# Patient Record
Sex: MALE | Race: BLACK OR AFRICAN AMERICAN | NOT HISPANIC OR LATINO | Employment: FULL TIME | URBAN - METROPOLITAN AREA
[De-identification: names, ages, dates, MRNs, and addresses within clinical notes are randomized per-mention and may not be internally consistent; named-entity substitution may affect disease eponyms.]

---

## 2022-02-20 ENCOUNTER — HOSPITAL ENCOUNTER (EMERGENCY)
Facility: CLINIC | Age: 27
Discharge: HOME | End: 2022-02-20
Attending: EMERGENCY MEDICINE | Admitting: EMERGENCY MEDICINE
Payer: COMMERCIAL

## 2022-02-20 ENCOUNTER — APPOINTMENT (OUTPATIENT)
Dept: GENERAL RADIOLOGY | Facility: CLINIC | Age: 27
End: 2022-02-20
Attending: EMERGENCY MEDICINE
Payer: COMMERCIAL

## 2022-02-20 VITALS
OXYGEN SATURATION: 100 % | DIASTOLIC BLOOD PRESSURE: 60 MMHG | HEART RATE: 85 BPM | TEMPERATURE: 98.4 F | WEIGHT: 150 LBS | RESPIRATION RATE: 18 BRPM | SYSTOLIC BLOOD PRESSURE: 122 MMHG
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
## 2022-02-20 DIAGNOSIS — S50.02XA CONTUSION OF LEFT ELBOW, INITIAL ENCOUNTER: ICD-10-CM

## 2022-02-20 PROCEDURE — 99284 EMERGENCY DEPT VISIT MOD MDM: CPT | Performed by: EMERGENCY MEDICINE

## 2022-02-20 PROCEDURE — 73080 X-RAY EXAM OF ELBOW: CPT | Mod: LT

## 2022-02-20 PROCEDURE — 73080 X-RAY EXAM OF ELBOW: CPT | Mod: 26 | Performed by: RADIOLOGY

## 2022-02-20 PROCEDURE — 250N000011 HC RX IP 250 OP 636: Performed by: EMERGENCY MEDICINE

## 2022-02-20 PROCEDURE — 96372 THER/PROPH/DIAG INJ SC/IM: CPT | Performed by: EMERGENCY MEDICINE

## 2022-02-20 PROCEDURE — 250N000013 HC RX MED GY IP 250 OP 250 PS 637: Performed by: EMERGENCY MEDICINE

## 2022-02-20 RX ORDER — HYDROMORPHONE HYDROCHLORIDE 1 MG/ML
0.5 INJECTION, SOLUTION INTRAMUSCULAR; INTRAVENOUS; SUBCUTANEOUS
Status: DISCONTINUED | OUTPATIENT
Start: 2022-02-20 | End: 2022-02-20 | Stop reason: HOSPADM

## 2022-02-20 RX ORDER — HYDROMORPHONE HYDROCHLORIDE 1 MG/ML
0.5 INJECTION, SOLUTION INTRAMUSCULAR; INTRAVENOUS; SUBCUTANEOUS ONCE
Status: COMPLETED | OUTPATIENT
Start: 2022-02-20 | End: 2022-02-20

## 2022-02-20 RX ORDER — IBUPROFEN 600 MG/1
600 TABLET, FILM COATED ORAL ONCE
Status: COMPLETED | OUTPATIENT
Start: 2022-02-20 | End: 2022-02-20

## 2022-02-20 RX ORDER — IBUPROFEN 600 MG/1
600 TABLET, FILM COATED ORAL EVERY 6 HOURS PRN
Qty: 30 TABLET | Refills: 0 | Status: SHIPPED | OUTPATIENT
Start: 2022-02-20 | End: 2024-02-26

## 2022-02-20 RX ADMIN — HYDROMORPHONE HYDROCHLORIDE 0.5 MG: 1 INJECTION, SOLUTION INTRAMUSCULAR; INTRAVENOUS; SUBCUTANEOUS at 02:30

## 2022-02-20 RX ADMIN — IBUPROFEN 600 MG: 600 TABLET ORAL at 02:55

## 2022-02-20 ASSESSMENT — ENCOUNTER SYMPTOMS
DIZZINESS: 0
DIARRHEA: 0
EYE PAIN: 0
DIFFICULTY URINATING: 0
NAUSEA: 0
CHILLS: 0
ABDOMINAL PAIN: 0
COUGH: 0
JOINT SWELLING: 1
CONSTIPATION: 0
COLOR CHANGE: 0
WEAKNESS: 0
ABDOMINAL DISTENTION: 0
FATIGUE: 0
VOMITING: 0
CHEST TIGHTNESS: 0
SHORTNESS OF BREATH: 0
FEVER: 0
SORE THROAT: 0
FREQUENCY: 0
BACK PAIN: 0
MYALGIAS: 0
NECK PAIN: 0
DYSURIA: 0
CONFUSION: 0
HEADACHES: 0
ARTHRALGIAS: 0
PALPITATIONS: 0

## 2022-02-20 NOTE — ED TRIAGE NOTES
Pt fell on the ice outside 3 hours ago, causing injury to the left arm, pt unable to move arm, CMS intact, pt complains of pain in the elbow.

## 2022-02-20 NOTE — ED TRIAGE NOTES
Pt fell on the ice 3 hours ago, states he injured his right arm, pt complains of pain more in the elbow. CMS Intact

## 2023-04-20 ENCOUNTER — HOSPITAL ENCOUNTER (EMERGENCY)
Facility: CLINIC | Age: 28
Discharge: HOME | End: 2023-04-20
Attending: EMERGENCY MEDICINE | Admitting: EMERGENCY MEDICINE
Payer: COMMERCIAL

## 2023-04-20 VITALS
HEIGHT: 65 IN | HEART RATE: 66 BPM | BODY MASS INDEX: 23.32 KG/M2 | OXYGEN SATURATION: 100 % | DIASTOLIC BLOOD PRESSURE: 85 MMHG | SYSTOLIC BLOOD PRESSURE: 126 MMHG | RESPIRATION RATE: 18 BRPM | WEIGHT: 140 LBS | TEMPERATURE: 97.6 F

## 2023-04-20 DIAGNOSIS — Z91.09 ENVIRONMENTAL ALLERGIES: ICD-10-CM

## 2023-04-20 PROCEDURE — 250N000013 HC RX MED GY IP 250 OP 250 PS 637: Performed by: EMERGENCY MEDICINE

## 2023-04-20 PROCEDURE — 99284 EMERGENCY DEPT VISIT MOD MDM: CPT | Mod: 25

## 2023-04-20 PROCEDURE — 250N000009 HC RX 250: Performed by: EMERGENCY MEDICINE

## 2023-04-20 RX ORDER — CETIRIZINE HYDROCHLORIDE 10 MG/1
10 TABLET ORAL DAILY
Qty: 30 TABLET | Refills: 0 | Status: SHIPPED | OUTPATIENT
Start: 2023-04-20 | End: 2024-02-26

## 2023-04-20 RX ORDER — DIPHENHYDRAMINE HCL 50 MG
50 CAPSULE ORAL ONCE
Status: COMPLETED | OUTPATIENT
Start: 2023-04-20 | End: 2023-04-20

## 2023-04-20 RX ORDER — POLYVINYL ALCOHOL 14 MG/ML
1 SOLUTION/ DROPS OPHTHALMIC
Qty: 15 ML | Refills: 0 | Status: SHIPPED | OUTPATIENT
Start: 2023-04-20 | End: 2024-02-26

## 2023-04-20 RX ORDER — PROPARACAINE HYDROCHLORIDE 5 MG/ML
1 SOLUTION/ DROPS OPHTHALMIC ONCE
Status: COMPLETED | OUTPATIENT
Start: 2023-04-20 | End: 2023-04-20

## 2023-04-20 RX ADMIN — PROPARACAINE HYDROCHLORIDE 1 DROP: 5 SOLUTION/ DROPS OPHTHALMIC at 06:30

## 2023-04-20 RX ADMIN — DIPHENHYDRAMINE HYDROCHLORIDE 50 MG: 50 CAPSULE ORAL at 06:30

## 2023-04-20 ASSESSMENT — ACTIVITIES OF DAILY LIVING (ADL): ADLS_ACUITY_SCORE: 35

## 2023-04-20 ASSESSMENT — VISUAL ACUITY: OU: 1

## 2023-04-20 ASSESSMENT — TONOMETRY
OD_IOP_MMHG: 10
OS_IOP_MMHG: 12

## 2023-04-20 NOTE — DISCHARGE INSTRUCTIONS
I suspect your dry eyes symptoms are related to environmental allergies.  I have started you on an oral antihistamine.  I have also started you on lubricating eyedrops.  Please take these medications as prescribed andmake an appointment to follow up with Eye Clinic (phone: [phone]) as soon as possible unless symptoms completely resolve.  You should also follow-up with your primary care physician as originally scheduled.

## 2023-04-20 NOTE — ED PROVIDER NOTES
"ED Provider Note  [facility]      History     Chief Complaint   Patient presents with     Allergic Reaction     HPI  Oliva Martinez is a 28 year old male who presents to the ED with concern for environmental allergies.  He states he has had severe the watery/itchy eyes for the past 3 weeks.  Has been progressively worsening.  Denies any obvious toxic exposures.  No nasal congestion, rhinorrhea, scratchy throat, or other allergy symptoms.  He has had similar symptoms in the past.  He last had a severe episode in 2021.  He went to the [facility] clinic and was prescribed \"eyedrops that made it feel better\".  He denies foreign bodies.  No vision change.  Has been progressively worsening for the past 3 weeks.  He did try some antihistamine eyedrops that he appears at the pharmacy and that seemed to make the symptoms worse.    Past Medical History  No past medical history on file.  No past surgical history on file.  cetirizine (ZYRTEC) 10 MG tablet  polyvinyl alcohol (LIQUIFILM TEARS) 1.4 % ophthalmic solution  ibuprofen (ADVIL/MOTRIN) 600 MG tablet      No Known Allergies  Family History  No family history on file.  Social History          A medically appropriate review of systems was performed with pertinent positives and negatives noted in the HPI, and all other systems negative.    Physical Exam   BP: 126/85  Pulse: 66  Temp: 97.6  F (36.4  C)  Resp: 18  Height: 165.1 cm (5' 5\")  Weight: 63.5 kg (140 lb)  SpO2: 100 %  Physical Exam  Vitals and nursing note reviewed.   Constitutional:       General: He is not in acute distress.     Appearance: Normal appearance.   HENT:      Head: Normocephalic.      Nose: Nose normal.   Eyes:      General: Lids are normal. Lids are everted, no foreign bodies appreciated. Vision grossly intact. Gaze aligned appropriately. No visual field deficit or scleral icterus.        Right eye: No discharge.      Intraocular pressure: Right eye pressure is 10 mmHg. " Left eye pressure is 12 mmHg.      Extraocular Movements: Extraocular movements intact.      Conjunctiva/sclera: Conjunctivae normal.      Right eye: Right conjunctiva is not injected. No chemosis, exudate or hemorrhage.     Left eye: Left conjunctiva is not injected. No chemosis, exudate or hemorrhage.     Pupils: Pupils are equal, round, and reactive to light.   Cardiovascular:      Rate and Rhythm: Normal rate and regular rhythm.   Pulmonary:      Effort: Pulmonary effort is normal.   Abdominal:      General: There is no distension.   Musculoskeletal:         General: No deformity. Normal range of motion.      Cervical back: Normal range of motion.   Skin:     General: Skin is warm.   Neurological:      Mental Status: He is alert and oriented to person, place, and time.   Psychiatric:         Mood and Affect: Mood normal.           ED Course, Procedures, & Data      No results found for any visits on 04/20/23.  Medications   diphenhydrAMINE (BENADRYL) capsule 50 mg (50 mg Oral $Given 4/20/23 0630)   proparacaine (ALCAINE) 0.5 % ophthalmic solution 1 drop (1 drop Both Eyes $Given 4/20/23 0630)     Labs Ordered and Resulted from Time of ED Arrival to Time of ED Departure - No data to display  No orders to display          Critical care was not performed.     Medical Decision Making  The patient's presentation was of low complexity (an acute and uncomplicated illness or injury).    The patient's evaluation involved:  history and exam without other MDM data elements    The patient's management necessitated moderate risk (prescription drug management including medications given in the ED).      Assessment & Plan    Patient presents to the ED for evaluation of dry eyes.  Has history environmental allergies.  Has had a similar symptoms in the past.  On arrival, he is complaining of severe discomfort in his eyes.  His eyes were all appear unremarkable.  No significant scleral injection.  No evidence of corneal abrasion.   No evidence of keratitis.  Intraocular pressures are normal bilaterally.  Vision is grossly intact.    Patient was given proparacaine with significant relief.  I suspect he likely has eye dryness/irritation from environmental allergies.  Started on Zyrtec and artificial tears.  Advised PCP follow up.  Also given eye clinic follow-up should his symptoms persist.    I have reviewed the nursing notes. I have reviewed the findings, diagnosis, plan and need for follow up with the patient.    Discharge Medication List as of 4/20/2023  6:56 AM      START taking these medications    Details   cetirizine (ZYRTEC) 10 MG tablet Take 1 tablet (10 mg) by mouth daily, Disp-30 tablet, R-0, Local Print      polyvinyl alcohol (LIQUIFILM TEARS) 1.4 % ophthalmic solution Place 1 drop into both eyes every 2 hours as needed for dry eyes, Disp-15 mL, R-0, Local Print             Final diagnoses:   Environmental allergies       Roderick Arellano DO  [facility] EMERGENCY DEPARTMENT  4/20/2023     Roderick Arellano DO  04/20/23 0719

## 2023-04-20 NOTE — ED TRIAGE NOTES
Pt c/o allergies, watery eyes, runny nose, afebrile, no cough, itchy eyes-both, two weeks ago, attempted to get appt at clinic, has gotten worse over the last few days, taking benadryl and over the counter eye drops, the watery eyes get worse when sleeping, pt with eyes closed in triage, itching eyes.     Triage Assessment     Row Name 04/20/23 0543       Triage Assessment (Adult)    Airway WDL WDL       Respiratory WDL    Respiratory WDL WDL       Skin Circulation/Temperature WDL    Skin Circulation/Temperature WDL WDL       Cardiac WDL    Cardiac WDL WDL       Peripheral/Neurovascular WDL    Peripheral Neurovascular WDL WDL       Cognitive/Neuro/Behavioral WDL    Cognitive/Neuro/Behavioral WDL WDL

## 2023-08-08 ENCOUNTER — HOSPITAL ENCOUNTER (EMERGENCY)
Facility: CLINIC | Age: 28
Discharge: HOME | End: 2023-08-08
Attending: EMERGENCY MEDICINE | Admitting: EMERGENCY MEDICINE
Payer: MEDICAID

## 2023-08-08 VITALS
BODY MASS INDEX: 22.82 KG/M2 | TEMPERATURE: 97.9 F | DIASTOLIC BLOOD PRESSURE: 84 MMHG | RESPIRATION RATE: 14 BRPM | OXYGEN SATURATION: 100 % | HEART RATE: 69 BPM | SYSTOLIC BLOOD PRESSURE: 124 MMHG | WEIGHT: 142 LBS | HEIGHT: 66 IN

## 2023-08-08 DIAGNOSIS — K14.6 TONGUE PAIN: ICD-10-CM

## 2023-08-08 DIAGNOSIS — K12.0 ORAL APHTHOUS ULCER: ICD-10-CM

## 2023-08-08 PROCEDURE — 99283 EMERGENCY DEPT VISIT LOW MDM: CPT | Performed by: EMERGENCY MEDICINE

## 2023-08-08 PROCEDURE — 99282 EMERGENCY DEPT VISIT SF MDM: CPT | Performed by: EMERGENCY MEDICINE

## 2023-08-08 NOTE — ED PROVIDER NOTES
History     Chief Complaint   Patient presents with    Oral Swelling     HPI  Oliva Martinez is a 28 year old otherwise healthy male who presents to the emergency department with a chief complaint of tongue pain.  Started on Sunday night.  No recent trauma.  Located to left side of tongue in the front.  There is a white lesion in this area.  No other areas of pain/other lesions.  The pain is worse with palpation of the affected area.  The patient has not tried any medications or other management of the lesion/pain.    The patient presents to the emergency department today accompanied by his mother.    I have reviewed the Medications, Allergies, Past Medical and Surgical History, and Social History in the Epic system.    History reviewed. No pertinent past medical history.  History reviewed. No pertinent surgical history.  No current facility-administered medications for this encounter.     Current Outpatient Medications   Medication    cetirizine (ZYRTEC) 10 MG tablet    ibuprofen (ADVIL/MOTRIN) 600 MG tablet    polyvinyl alcohol (LIQUIFILM TEARS) 1.4 % ophthalmic solution     No Known Allergies  Past medical history, past surgical history, medications, and allergies were reviewed with the patient. Additional pertinent items: None    Social History     Socioeconomic History    Marital status: Single     Spouse name: Not on file    Number of children: Not on file    Years of education: Not on file    Highest education level: Not on file   Occupational History    Not on file   Tobacco Use    Smoking status: Never    Smokeless tobacco: Never   Substance and Sexual Activity    Alcohol use: Never    Drug use: Never    Sexual activity: Not on file   Other Topics Concern    Not on file   Social History Narrative    Not on file     Social Determinants of Health     Financial Resource Strain: Not on file   Food Insecurity: Not on file   Transportation Needs: Not on file   Physical Activity: Not on file   Stress: Not on  "file   Social Connections: Not on file   Intimate Partner Violence: Not on file   Housing Stability: Not on file     Social history was reviewed with the patient. Additional pertinent items: None    Review of Systems  A medically appropriate review of systems was performed with pertinent positives and negatives noted in the HPI, and all other systems negative.    Physical Exam   BP: 124/84  Pulse: 69  Temp: 97.9  F (36.6  C)  Resp: 14  Height: 167.6 cm (5' 6\")  Weight: 64.4 kg (142 lb)  SpO2: 100 %      General: Well nourished, well developed, NAD  HEENT: EOMI, anicteric. NCAT, MMM, tenderness and white-colored lesion to anterior left tongue on the underside, no other oral lesions noted  Neck: no jugular venous distension, supple, nl ROM  Cardiac: Regular rate, extremities well-perfused  Pulm: Airway patent, NLB, normal RR  Skin: Warm and dry to the touch.  No rash  Extremities: No LE edema, no cyanosis, w/w/p  Neuro: A&Ox3, no gross focal deficits    ED Course        Procedures                           Labs Ordered and Resulted from Time of ED Arrival to Time of ED Departure - No data to display         No results found for this or any previous visit (from the past 24 hour(s)).    Labs, vital signs, and imaging studies were reviewed by me.    Medications - No data to display    Assessments & Plan (with Medical Decision Making)   Oliva Martinez is a 28 year old male who presents with tongue pain.  Examination is consistent with aphthous ulcer    Critical care was not performed.     Medical Decision Making  The patient's presentation was of low complexity (an acute and uncomplicated illness or injury).    The patient's evaluation involved:  history and exam without other MDM data elements    The patient's management necessitated moderate risk (prescription drug management including medications given in the ED).    I have reviewed the nursing notes.    I have reviewed the findings, diagnosis, plan and need for " follow up with the patient.    Patient to be discharged home. Advised to follow up with PCP within 1 week. To return to ER immediately with any new/worsening symptoms. Plan of care discussed with patient who expresses understanding and agrees with plan of care.    New Prescriptions    BENZOCAINE (ANBESOL) 10 % GEL    Take by mouth 4 times daily as needed for mouth sores       Final diagnoses:   Tongue pain   Oral aphthous ulcer       FLORECITA GONZÁLES MD  8/8/2023   [facility] EMERGENCY DEPARTMENT       Florecita Gonzáles MD  08/08/23 100

## 2023-08-08 NOTE — DISCHARGE INSTRUCTIONS
TODAY'S VISIT:  You were seen today for tongue pain   -   - If you had any labs or imaging/radiology tests performed today, you should also discuss these tests with your usual provider.     FOLLOW-UP:  Please make an appointment to follow up with:  - Your Primary Care Provider. If you do not have a PCP, please call the Primary Care Center (phone: [phone] for an appointment    - Have your provider review the results from today's visit with you again to make sure no further follow-up or additional testing is needed based on those results.     RETURN TO THE EMERGENCY DEPARTMENT  Return to the Emergency Department at any time for any new or worsening symptoms or any concerns.

## 2023-10-14 ENCOUNTER — HOSPITAL ENCOUNTER (EMERGENCY)
Facility: CLINIC | Age: 28
Discharge: HOME | End: 2023-10-14
Attending: EMERGENCY MEDICINE | Admitting: EMERGENCY MEDICINE

## 2023-10-14 VITALS
SYSTOLIC BLOOD PRESSURE: 137 MMHG | OXYGEN SATURATION: 100 % | HEART RATE: 64 BPM | RESPIRATION RATE: 12 BRPM | TEMPERATURE: 98.1 F | DIASTOLIC BLOOD PRESSURE: 90 MMHG

## 2023-10-14 DIAGNOSIS — K14.8 TONGUE LESION: ICD-10-CM

## 2023-10-14 LAB
ALBUMIN SERPL BCG-MCNC: 4.6 G/DL (ref 3.5–5.2)
ALP SERPL-CCNC: 72 U/L (ref 40–129)
ALT SERPL W P-5'-P-CCNC: 11 U/L (ref 0–70)
ANION GAP SERPL CALCULATED.3IONS-SCNC: 13 MMOL/L (ref 7–15)
AST SERPL W P-5'-P-CCNC: 20 U/L (ref 0–45)
BASO+EOS+MONOS # BLD AUTO: NORMAL 10*3/UL
BASO+EOS+MONOS NFR BLD AUTO: NORMAL %
BASOPHILS # BLD AUTO: 0 10E3/UL (ref 0–0.2)
BASOPHILS NFR BLD AUTO: 1 %
BILIRUB SERPL-MCNC: 0.3 MG/DL
BUN SERPL-MCNC: 7.6 MG/DL (ref 6–20)
CALCIUM SERPL-MCNC: 9.9 MG/DL (ref 8.6–10)
CHLORIDE SERPL-SCNC: 102 MMOL/L (ref 98–107)
CREAT SERPL-MCNC: 0.69 MG/DL (ref 0.67–1.17)
DEPRECATED HCO3 PLAS-SCNC: 24 MMOL/L (ref 22–29)
EGFRCR SERPLBLD CKD-EPI 2021: >90 ML/MIN/1.73M2
EOSINOPHIL # BLD AUTO: 0.1 10E3/UL (ref 0–0.7)
EOSINOPHIL NFR BLD AUTO: 1 %
ERYTHROCYTE [DISTWIDTH] IN BLOOD BY AUTOMATED COUNT: 12.1 % (ref 10–15)
GLUCOSE SERPL-MCNC: 96 MG/DL (ref 70–99)
HCT VFR BLD AUTO: 45.3 % (ref 40–53)
HGB BLD-MCNC: 15.8 G/DL (ref 13.3–17.7)
HOLD SPECIMEN: NORMAL
HOLD SPECIMEN: NORMAL
IMM GRANULOCYTES # BLD: 0 10E3/UL
IMM GRANULOCYTES NFR BLD: 0 %
LYMPHOCYTES # BLD AUTO: 1.2 10E3/UL (ref 0.8–5.3)
LYMPHOCYTES NFR BLD AUTO: 21 %
MCH RBC QN AUTO: 29.8 PG (ref 26.5–33)
MCHC RBC AUTO-ENTMCNC: 34.9 G/DL (ref 31.5–36.5)
MCV RBC AUTO: 86 FL (ref 78–100)
MONOCYTES # BLD AUTO: 0.5 10E3/UL (ref 0–1.3)
MONOCYTES NFR BLD AUTO: 8 %
NEUTROPHILS # BLD AUTO: 3.9 10E3/UL (ref 1.6–8.3)
NEUTROPHILS NFR BLD AUTO: 69 %
NRBC # BLD AUTO: 0 10E3/UL
NRBC BLD AUTO-RTO: 0 /100
PLATELET # BLD AUTO: 250 10E3/UL (ref 150–450)
POTASSIUM SERPL-SCNC: 3.7 MMOL/L (ref 3.4–5.3)
PROT SERPL-MCNC: 8 G/DL (ref 6.4–8.3)
RBC # BLD AUTO: 5.3 10E6/UL (ref 4.4–5.9)
SODIUM SERPL-SCNC: 139 MMOL/L (ref 135–145)
WBC # BLD AUTO: 5.6 10E3/UL (ref 4–11)

## 2023-10-14 PROCEDURE — 80053 COMPREHEN METABOLIC PANEL: CPT | Performed by: EMERGENCY MEDICINE

## 2023-10-14 PROCEDURE — 258N000003 HC RX IP 258 OP 636: Performed by: EMERGENCY MEDICINE

## 2023-10-14 PROCEDURE — 99284 EMERGENCY DEPT VISIT MOD MDM: CPT | Mod: 25 | Performed by: EMERGENCY MEDICINE

## 2023-10-14 PROCEDURE — 99284 EMERGENCY DEPT VISIT MOD MDM: CPT | Performed by: EMERGENCY MEDICINE

## 2023-10-14 PROCEDURE — 96374 THER/PROPH/DIAG INJ IV PUSH: CPT | Performed by: EMERGENCY MEDICINE

## 2023-10-14 PROCEDURE — 96361 HYDRATE IV INFUSION ADD-ON: CPT | Performed by: EMERGENCY MEDICINE

## 2023-10-14 PROCEDURE — 250N000011 HC RX IP 250 OP 636: Mod: JZ | Performed by: EMERGENCY MEDICINE

## 2023-10-14 PROCEDURE — 85004 AUTOMATED DIFF WBC COUNT: CPT | Performed by: EMERGENCY MEDICINE

## 2023-10-14 PROCEDURE — 250N000013 HC RX MED GY IP 250 OP 250 PS 637: Performed by: EMERGENCY MEDICINE

## 2023-10-14 PROCEDURE — 36415 COLL VENOUS BLD VENIPUNCTURE: CPT | Performed by: EMERGENCY MEDICINE

## 2023-10-14 RX ORDER — OXYCODONE HCL 5 MG/5 ML
5 SOLUTION, ORAL ORAL ONCE
Status: COMPLETED | OUTPATIENT
Start: 2023-10-14 | End: 2023-10-14

## 2023-10-14 RX ORDER — KETOROLAC TROMETHAMINE 15 MG/ML
10 INJECTION, SOLUTION INTRAMUSCULAR; INTRAVENOUS ONCE
Status: COMPLETED | OUTPATIENT
Start: 2023-10-14 | End: 2023-10-14

## 2023-10-14 RX ORDER — OXYCODONE HCL 5 MG/5 ML
5 SOLUTION, ORAL ORAL EVERY 6 HOURS PRN
Qty: 30 ML | Refills: 0 | Status: SHIPPED | OUTPATIENT
Start: 2023-10-14 | End: 2024-02-26

## 2023-10-14 RX ADMIN — KETOROLAC TROMETHAMINE 10 MG: 15 INJECTION, SOLUTION INTRAMUSCULAR; INTRAVENOUS at 13:40

## 2023-10-14 RX ADMIN — OXYCODONE HYDROCHLORIDE 5 MG: 5 SOLUTION ORAL at 13:39

## 2023-10-14 RX ADMIN — SODIUM CHLORIDE 1000 ML: 9 INJECTION, SOLUTION INTRAVENOUS at 13:39

## 2023-10-14 ASSESSMENT — ACTIVITIES OF DAILY LIVING (ADL)
ADLS_ACUITY_SCORE: 35
ADLS_ACUITY_SCORE: 33
ADLS_ACUITY_SCORE: 35

## 2023-10-14 NOTE — CONSULTS
Otolaryngology Consult Note  October 14, 2023      CC: tongue lesion     HPI: Oliva Martinez is a 28 year old male with no significant past medical history who presents with a left tongue lesion. He says this started 2 days ago. He was seen by an oral surgery clinic for this where he was prescribed nystatin, dexamethasone, and peridex mouth rinses as well as augmentin. He says the augmentin upsets his stomach. He has been using these but has not seen any improvement. He has no difficulty breathing but finds it hard to talk and eat much. He says this happened once in August but the lesion was much smaller, though in the same place, and resolved on its own. He is not on any immunosuppressants.     PMH:  No past medical history on file.     No Known Allergies    Past Surgical Hx:  No past surgical history on file.    Social History: NO history of smoking or alcohol use  Social History     Socioeconomic History     Marital status: Single     Spouse name: Not on file     Number of children: Not on file     Years of education: Not on file     Highest education level: Not on file   Occupational History     Not on file   Tobacco Use     Smoking status: Never     Smokeless tobacco: Never   Substance and Sexual Activity     Alcohol use: Never     Drug use: Never     Sexual activity: Not on file   Other Topics Concern     Not on file   Social History Narrative     Not on file     Social Determinants of Health     Financial Resource Strain: Not on file   Food Insecurity: Not on file   Transportation Needs: Not on file   Physical Activity: Not on file   Stress: Not on file   Social Connections: Not on file   Interpersonal Safety: Not on file   Housing Stability: Not on file       Family History:  No family history on file.    ROS: 12 point review of systems is negative unless noted in HPI.    PHYSICAL EXAM:  BP (!) 137/90   Pulse 64   Temp 98.1  F (36.7  C) (Oral)   Resp 12   SpO2 100%   General: laying in bed, no acute  distress  HEAD: normocephalic, atraumatic  Face: symmetrical, CN VII intact bilaterally (HB 1), no swelling, edema, or erythema. Sensation V1-V3 intact and equal bilaterally.   Eyes: EOMI, clear sclera  Nose: no anterior drainage  Mouth: moist, normal dentition, has braces. Over left lateral ventral surface of the tongue there is a painful ulceration with central fibrinous debris and surrounding erythema. Tongue itself is very soft and mobile.   Oropharynx: tonsils within normal limits, uvula midline, no oropharyngeal erythema  Neck: no LAD, trachea midline  Respiratory: breathing non-labored on RA, no stridor    ROUTINE IP LABS (Last four results)  BMP  Recent Labs   Lab 10/14/23  1339      POTASSIUM 3.7   CHLORIDE 102   RADHA 9.9   CO2 24   BUN 7.6   CR 0.69   GLC 96     CBC  Recent Labs   Lab 10/14/23  1339   WBC 5.6   RBC 5.30   HGB 15.8   HCT 45.3   MCV 86   MCH 29.8   MCHC 34.9   RDW 12.1        INRNo lab results found in last 7 days.    Imaging: N/A    Assessment and Plan  Oliva Martinez is a 28 year old male with no significant PMHx who presents with an oral lesion consistent with aphthous ulcer over the ventral surface of left lateral tongue. He was started on the correct oral rinses by the oral surgeon that should help to improve his symptoms. Would recommend adding viscous lidocaine to assist with pain control. Unfortunately patient left the ED AMA prior to being able to be sent home with this additional medication.     - Continue peridex, nystatin, and topical steroid rinses  - Does not need augmentin if patient feels sick from taking it   - Recommend addition of viscous lidocaine to be applied topically to tongue lesion- please fill at [facility] pharmacy as many outpt pharmacies will not fill right now  - Can use braces wax to apply to lingual surface of teeth if causing irritation to lesion    Patient and plan was discussed with staff Dr. Hansen.    Caroline Harry MD  Otolaryngology-Head & Neck  Surgery

## 2023-10-14 NOTE — DISCHARGE INSTRUCTIONS
Use the medications you were given for pain and follow-up in the ENT clinic    Please make an appointment to follow up with [facility] (phone: [phone])

## 2023-10-14 NOTE — ED PROVIDER NOTES
ED Provider Note  [facility]      History     Chief Complaint   Patient presents with    Mouth Lesions     HPI  Oliva Martinez is a 28 year old male who is otherwise healthy and presents to the emergency department with a tongue lesion.  He reports developing a sore on the left side of his tongue 2 days ago.  He denies any trauma.  He reports he was seen by the oral surgeons in clinic though we do not have record of this.  He was started on nystatin mouthwash, Augmentin, Magic mouthwash but has had no improvement in symptoms.  He reports it is very difficult for him to talk and has difficulty with p.o. intake.  He denies any fever.  Denies any shortness of breath.  He did have 1 similar episode in August without clear etiology and which resolved on its own.      Physical Exam   BP: (!) 137/90  Pulse: 64  Temp: 98.1  F (36.7  C)  Resp: 12  SpO2: 100 %  Physical Exam  General: patient is alert and oriented and in no acute distress   Head: atraumatic and normocephalic   EENT: moist mucus membranes without tonsillar erythema or exudates, no other oral lesions noted, large lesion noted on the left side of the tongue, trismus  Neck: supple   Cardiovascular: regular rate and rhythm, extremities warm and well perfused, no lower extremity edema  Pulmonary: No respiratory distress   musculoskeletal: normal range of motion   Neurological: alert and oriented, moving all extremities symmetrically, gait normal   Skin: warm, dry         ED Course, Procedures, & Data      Procedures                     Results for orders placed or performed during the hospital encounter of 10/14/23   Comprehensive metabolic panel     Status: Normal   Result Value Ref Range    Sodium 139 135 - 145 mmol/L    Potassium 3.7 3.4 - 5.3 mmol/L    Carbon Dioxide (CO2) 24 22 - 29 mmol/L    Anion Gap 13 7 - 15 mmol/L    Urea Nitrogen 7.6 6.0 - 20.0 mg/dL    Creatinine 0.69 0.67 - 1.17 mg/dL    GFR Estimate >90 >60 mL/min/1.73m2     Calcium 9.9 8.6 - 10.0 mg/dL    Chloride 102 98 - 107 mmol/L    Glucose 96 70 - 99 mg/dL    Alkaline Phosphatase 72 40 - 129 U/L    AST 20 0 - 45 U/L    ALT 11 0 - 70 U/L    Protein Total 8.0 6.4 - 8.3 g/dL    Albumin 4.6 3.5 - 5.2 g/dL    Bilirubin Total 0.3 <=1.2 mg/dL   CBC with platelets and differential     Status: None   Result Value Ref Range    WBC Count 5.6 4.0 - 11.0 10e3/uL    RBC Count 5.30 4.40 - 5.90 10e6/uL    Hemoglobin 15.8 13.3 - 17.7 g/dL    Hematocrit 45.3 40.0 - 53.0 %    MCV 86 78 - 100 fL    MCH 29.8 26.5 - 33.0 pg    MCHC 34.9 31.5 - 36.5 g/dL    RDW 12.1 10.0 - 15.0 %    Platelet Count 250 150 - 450 10e3/uL    % Neutrophils 69 %    % Lymphocytes 21 %    % Monocytes 8 %    Mids % (Monos, Eos, Basos)      % Eosinophils 1 %    % Basophils 1 %    % Immature Granulocytes 0 %    NRBCs per 100 WBC 0 <1 /100    Absolute Neutrophils 3.9 1.6 - 8.3 10e3/uL    Absolute Lymphocytes 1.2 0.8 - 5.3 10e3/uL    Absolute Monocytes 0.5 0.0 - 1.3 10e3/uL    Mids Abs (Monos, Eos, Basos)      Absolute Eosinophils 0.1 0.0 - 0.7 10e3/uL    Absolute Basophils 0.0 0.0 - 0.2 10e3/uL    Absolute Immature Granulocytes 0.0 <=0.4 10e3/uL    Absolute NRBCs 0.0 10e3/uL   Taylor Draw     Status: None (In process)    Narrative    The following orders were created for panel order Taylor Draw.  Procedure                               Abnormality         Status                     ---------                               -----------         ------                     Extra Blue Top Tube[[id number]]                              In process                 Extra Red Top Tube[[id number]]                               In process                   Please view results for these tests on the individual orders.   CBC with platelets differential     Status: None    Narrative    The following orders were created for panel order CBC with platelets differential.  Procedure                               Abnormality         Status                      ---------                               -----------         ------                     CBC with platelets and d...[[id number]]                      Final result                 Please view results for these tests on the individual orders.     Medications   sodium chloride 0.9% BOLUS 1,000 mL (1,000 mLs Intravenous $New Bag 10/14/23 0020)   ketorolac (TORADOL) injection 10 mg (10 mg Intravenous $Given 10/14/23 1340)   oxyCODONE (ROXICODONE) solution 5 mg (5 mg Oral $Given 10/14/23 1338)     Labs Ordered and Resulted from Time of ED Arrival to Time of ED Departure   COMPREHENSIVE METABOLIC PANEL - Normal       Result Value    Sodium 139      Potassium 3.7      Carbon Dioxide (CO2) 24      Anion Gap 13      Urea Nitrogen 7.6      Creatinine 0.69      GFR Estimate >90      Calcium 9.9      Chloride 102      Glucose 96      Alkaline Phosphatase 72      AST 20      ALT 11      Protein Total 8.0      Albumin 4.6      Bilirubin Total 0.3     CBC WITH PLATELETS AND DIFFERENTIAL    WBC Count 5.6      RBC Count 5.30      Hemoglobin 15.8      Hematocrit 45.3      MCV 86      MCH 29.8      MCHC 34.9      RDW 12.1      Platelet Count 250      % Neutrophils 69      % Lymphocytes 21      % Monocytes 8      Mids % (Monos, Eos, Basos)        % Eosinophils 1      % Basophils 1      % Immature Granulocytes 0      NRBCs per 100 WBC 0      Absolute Neutrophils 3.9      Absolute Lymphocytes 1.2      Absolute Monocytes 0.5      Mids Abs (Monos, Eos, Basos)        Absolute Eosinophils 0.1      Absolute Basophils 0.0      Absolute Immature Granulocytes 0.0      Absolute NRBCs 0.0       No orders to display          Critical care was not performed.     Medical Decision Making  The patient's presentation was of moderate complexity (an undiagnosed new problem with uncertain diagnosis).    The patient's evaluation involved:  review of external note(s) from 1 sources (ED notes)  ordering and/or review of 3+ test(s) in this encounter (see  separate area of note for details)  discussion of management or test interpretation with another health professional (ENT)    The patient's management necessitated moderate risk (prescription drug management including medications given in the ED) and further care after sign-out to Dr. Temple (see their note for further management).    Assessment & Plan    Mr. Martinez is a 28 year old male who is otherwise healthy and presents to the emergency department with a tongue lesion.  He is noted to be mildly hypertensive otherwise hemodynamically stable, afebrile and in no respiratory distress.  Laboratory evaluation shows no significant electrolyte abnormalities, no leukocytosis.  He was given IV fluids, Toradol and liquid oxycodone for symptom management.  ENT was consulted given his worsening symptoms despite outpatient treatment with recommendations pending.      I have reviewed the nursing notes. I have reviewed the findings, diagnosis, plan and need for follow up with the patient.    New Prescriptions    No medications on file           Elaine Meier MD  [facility] EMERGENCY DEPARTMENT  10/14/2023     Elaine Meier MD  10/14/23 1444

## 2023-10-14 NOTE — ED TRIAGE NOTES
Pt presents with L sided tongue lesion that he has been seen by his doctor for recently. He says he has had the pain since Thursday. He went to the clinic yesterday and they gave him magic mouthwash, augmentin, and chlorhexidine. This is the second time this has happened- it happened back in August and he was see here for it. He says he can't talk due to his tongue being big.   Pt says he can breathe ok

## 2024-02-26 ENCOUNTER — OFFICE VISIT (OUTPATIENT)
Dept: FAMILY MEDICINE | Facility: CLINIC | Age: 29
End: 2024-02-26
Payer: COMMERCIAL

## 2024-02-26 VITALS
WEIGHT: 143 LBS | BODY MASS INDEX: 22.44 KG/M2 | RESPIRATION RATE: 15 BRPM | TEMPERATURE: 97.4 F | SYSTOLIC BLOOD PRESSURE: 113 MMHG | OXYGEN SATURATION: 98 % | HEART RATE: 73 BPM | HEIGHT: 67 IN | DIASTOLIC BLOOD PRESSURE: 73 MMHG

## 2024-02-26 DIAGNOSIS — Z11.4 SCREENING FOR HIV (HUMAN IMMUNODEFICIENCY VIRUS): ICD-10-CM

## 2024-02-26 DIAGNOSIS — Z00.00 ANNUAL PHYSICAL EXAM: Primary | ICD-10-CM

## 2024-02-26 DIAGNOSIS — Z78.9 HEPATITIS B VACCINATION STATUS UNKNOWN: ICD-10-CM

## 2024-02-26 DIAGNOSIS — Z13.228 SCREENING FOR METABOLIC DISORDER: ICD-10-CM

## 2024-02-26 DIAGNOSIS — Z13.220 SCREENING FOR LIPID DISORDERS: ICD-10-CM

## 2024-02-26 DIAGNOSIS — Z11.59 NEED FOR HEPATITIS C SCREENING TEST: ICD-10-CM

## 2024-02-26 LAB
ANION GAP SERPL CALCULATED.3IONS-SCNC: 15 MMOL/L (ref 7–15)
BUN SERPL-MCNC: 15.9 MG/DL (ref 6–20)
CALCIUM SERPL-MCNC: 9.7 MG/DL (ref 8.6–10)
CHLORIDE SERPL-SCNC: 102 MMOL/L (ref 98–107)
CHOLEST SERPL-MCNC: 250 MG/DL
CREAT SERPL-MCNC: 0.98 MG/DL (ref 0.67–1.17)
DEPRECATED HCO3 PLAS-SCNC: 24 MMOL/L (ref 22–29)
EGFRCR SERPLBLD CKD-EPI 2021: >90 ML/MIN/1.73M2
FASTING STATUS PATIENT QL REPORTED: YES
GLUCOSE SERPL-MCNC: 99 MG/DL (ref 70–99)
HBV SURFACE AB SERPL IA-ACNC: >1000 M[IU]/ML
HBV SURFACE AB SERPL IA-ACNC: REACTIVE M[IU]/ML
HCV AB SERPL QL IA: NONREACTIVE
HDLC SERPL-MCNC: 57 MG/DL
HIV 1+2 AB+HIV1 P24 AG SERPL QL IA: NONREACTIVE
LDLC SERPL CALC-MCNC: 185 MG/DL
NONHDLC SERPL-MCNC: 193 MG/DL
POTASSIUM SERPL-SCNC: 3.8 MMOL/L (ref 3.4–5.3)
SODIUM SERPL-SCNC: 141 MMOL/L (ref 135–145)
TRIGL SERPL-MCNC: 38 MG/DL

## 2024-02-26 PROCEDURE — 86706 HEP B SURFACE ANTIBODY: CPT | Performed by: FAMILY MEDICINE

## 2024-02-26 PROCEDURE — 80061 LIPID PANEL: CPT | Performed by: FAMILY MEDICINE

## 2024-02-26 PROCEDURE — 86803 HEPATITIS C AB TEST: CPT | Performed by: FAMILY MEDICINE

## 2024-02-26 PROCEDURE — 87389 HIV-1 AG W/HIV-1&-2 AB AG IA: CPT | Performed by: FAMILY MEDICINE

## 2024-02-26 PROCEDURE — 80048 BASIC METABOLIC PNL TOTAL CA: CPT | Performed by: FAMILY MEDICINE

## 2024-02-26 SDOH — HEALTH STABILITY: PHYSICAL HEALTH: ON AVERAGE, HOW MANY DAYS PER WEEK DO YOU ENGAGE IN MODERATE TO STRENUOUS EXERCISE (LIKE A BRISK WALK)?: 1 DAY

## 2024-02-26 ASSESSMENT — SOCIAL DETERMINANTS OF HEALTH (SDOH): HOW OFTEN DO YOU GET TOGETHER WITH FRIENDS OR RELATIVES?: ONCE A WEEK

## 2024-02-26 NOTE — PATIENT INSTRUCTIONS
"CARLI Baptiste is a healthy 30 yo making his first visit to our clinic    ASSESSMENT/PLAN:    Annual Exam/Preventive Issues   - Labs: Check Lipids, BMP, HIV, Hep C and Hep B antibody status  - Immunizations: Lacking in vaccinations for Hep B (at least per our records). He had immunizations in [place]. Also, lacking for Tdap, influenza and Covid booster.   He's uninterested in them now.       -Specific concerns:     Poor sleep   -Encouraged more exercise, ideally on a daily basis. This may not be possible given school and work on many weekdays. Still, if you can find 30-60 minutes for weight lifting or running, that would help.   Also, on the weekends, be sure to get in some longer bouts of exercise.   - Consider 1- 5 mg of Melatonin  - Also, may want to take 25-50 mg of Diphenhydramine on a few nights per week. Take the medication at around 8pm so that you are not too sleepy in the morning.   - Consider reading prior to sleep  - Also, try \"night eye shades.\"       -Follow up: as needed or in a year.     Dom Leos MD  Family Medicine and Sports Medicine  "

## 2024-02-26 NOTE — NURSING NOTE
"29 year old  Chief Complaint   Patient presents with    Physical     Physical and establish care.        Blood pressure 113/73, pulse 73, temperature 97.4  F (36.3  C), temperature source Skin, resp. rate 15, height 1.702 m (5' 7\"), weight 64.9 kg (143 lb), SpO2 98%. Body mass index is 22.4 kg/m .  There is no problem list on file for this patient.      Wt Readings from Last 2 Encounters:   02/26/24 64.9 kg (143 lb)   08/08/23 64.4 kg (142 lb)     BP Readings from Last 3 Encounters:   02/26/24 113/73   10/14/23 (!) 137/90   08/08/23 124/84         Current Outpatient Medications   Medication    benzocaine (ANBESOL) 10 % gel    cetirizine (ZYRTEC) 10 MG tablet    ibuprofen (ADVIL/MOTRIN) 600 MG tablet    oxyCODONE (ROXICODONE) 5 MG/5ML solution    polyvinyl alcohol (LIQUIFILM TEARS) 1.4 % ophthalmic solution     No current facility-administered medications for this visit.       Social History     Tobacco Use    Smoking status: Never    Smokeless tobacco: Never   Vaping Use    Vaping Use: Never used   Substance Use Topics    Alcohol use: Never    Drug use: Never       Health Maintenance Due   Topic Date Due    ADVANCE CARE PLANNING  Never done    HIV SCREENING  Never done    HEPATITIS C SCREENING  Never done    HEPATITIS B IMMUNIZATION (1 of 3 - 19+ 3-dose series) Never done    DTAP/TDAP/TD IMMUNIZATION (1 - Tdap) Never done    YEARLY PREVENTIVE VISIT  10/12/2022    INFLUENZA VACCINE (1) 09/01/2023    COVID-19 Vaccine (3 - 2023-24 season) 09/01/2023       No results found for: \"PAP\"      February 26, 2024 9:23 AM    "

## 2024-02-26 NOTE — PROGRESS NOTES
"Oliva Martinez presents to [facility] today to have an annual exam.    IMPRESSION  Oliva is a healthy 30 yo making his first visit to our clinic    ASSESSMENT/PLAN:    Annual Exam/Preventive Issues   - Labs: Check Lipids, BMP, HIV, Hep C and Hep B antibody status  - Immunizations: Lacking in vaccinations for Hep B (at least per our records). He had immunizations in [place]. Also, lacking for Tdap, influenza and Covid booster.   He's uninterested in them now.       -Specific concerns:     Poor sleep   -Encouraged more exercise, ideally on a daily basis. This may not be possible given school and work on many weekdays. Still, if you can find 30-60 minutes for weight lifting or running, that would help.   Also, on the weekends, be sure to get in some longer bouts of exercise.   - Consider 1- 5 mg of Melatonin  - Also, may want to take 25-50 mg of Diphenhydramine on a few nights per week. Take the medication at around 8pm so that you are not too sleepy in the morning.   - Consider reading prior to sleep  - Also, try \"night eye shades.\"       -Follow up: as needed or in a year.     Dom Leos MD  Family Medicine and Sports Medicine      INTRODUCTION:   This is my first time meeting Oliva. It's his first visit to our clinic.  He's here for an annual preventive exam.     In addition to Preventive issues, he wishes to discuss poor sleep. This started about 4 months ago. He goes to bed at 9pm. Falls asleep around 10pm. Awakes around 2am and has trouble getting back to sleep. Sometimes not until 4-5am. Must awaken at 6am to go to college at [employer].     He's stops his cell phone usage at 8pm.   Does not read books or anything, but just lies awake.     Lives with his mom. Reports that home life is good. He's not worried about anything.           2/26/2024   General Health   How would you rate your overall physical health? Good   Feel stress (tense, anxious, or unable to sleep) Very much   (!) STRESS " CONCERN      2/26/2024   Nutrition   Three or more servings of calcium each day? (!) I DON'T KNOW   Diet: I don't know   How many servings of fruit and vegetables per day? (!) 0-1   How many sweetened beverages each day? 0-1         2/26/2024   Exercise   Days per week of moderate/strenous exercise 1 day   (!) EXERCISE CONCERN      2/26/2024   Social Factors   Frequency of gathering with friends or relatives Once a week   Worry food won't last until get money to buy more No    No   Food not last or not have enough money for food? No    No   Do you have housing?  Yes    Yes   Are you worried about losing your housing? No    No   Lack of transportation? No    No   Unable to get utilities (heat,electricity)? No    No         2/26/2024   Dental   Dentist two times every year? Yes         2/26/2024   TB Screening   Were you born outside of [place]?  (!) YES  in [facility]         Today's PHQ-2 Score:       2/26/2024     9:00 AM   PHQ-2 ( 1999 Pfizer)   Q1: Little interest or pleasure in doing things 0   Q2: Feeling down, depressed or hopeless 0   PHQ-2 Score 0   Q1: Little interest or pleasure in doing things Not at all   Q2: Feeling down, depressed or hopeless Not at all   PHQ-2 Score 0           2/26/2024   Substance Use   Alcohol more than 3/day or more than 7/wk No   Do you use any other substances recreationally? No     Social History     Tobacco Use    Smoking status: Never    Smokeless tobacco: Never   Vaping Use    Vaping Use: Never used   Substance Use Topics    Alcohol use: Never    Drug use: Never             2/26/2024   One time HIV Screening   Previous HIV test? No         2/26/2024   STI Screening   New sexual partner(s) since last STI/HIV test? No         2/26/2024   Contraception/Family Planning   Questions about contraception or family planning No         Current Medications include:   No current outpatient medications on file.     No Known Allergies    No past surgical history on file.    Health Maintenance  "  Topic Date Due    ADVANCE CARE PLANNING  Never done    HIV SCREENING  Never done    HEPATITIS C SCREENING  Never done    HEPATITIS B IMMUNIZATION (1 of 3 - 19+ 3-dose series) Never done    DTAP/TDAP/TD IMMUNIZATION (1 - Tdap) Never done    YEARLY PREVENTIVE VISIT  10/12/2022    INFLUENZA VACCINE (1) 09/01/2023    COVID-19 Vaccine (3 - 2023-24 season) 09/01/2023    PHQ-2 (once per calendar year)  Completed    Pneumococcal Vaccine: Pediatrics (0 to 5 Years) and At-Risk Patients (6 to 64 Years)  Aged Out    IPV IMMUNIZATION  Aged Out    HPV IMMUNIZATION  Aged Out    MENINGITIS IMMUNIZATION  Aged Out    RSV MONOCLONAL ANTIBODY  Aged Out       Immunizations are as follows:      Immunization History   Administered Date(s) Administered    COVID-19 MONOVALENT 12+ (Pfizer) 10/09/2021, 10/30/2021    Influenza Vaccine >6 months,quad, PF 10/29/2020    Influenza Vaccine, 6+MO IM (QUADRIVALENT W/PRESERVATIVES) 10/24/2019               2/26/2024   General Health   How would you rate your overall physical health? Good   Feel stress (tense, anxious, or unable to sleep) Very much   (!) STRESS CONCERN      2/26/2024   Nutrition   Three or more servings of calcium each day? (!) I DON'T KNOW   Diet: I don't know   How many servings of fruit and vegetables per day? (!) 0-1   How many sweetened beverages each day? 0-1         2/26/2024   Exercise   Days per week of moderate/strenous exercise 1 day   (!) EXERCISE CONCERN    Physical activity Soccer, but only 1 time/week as he's too busy.     Alcohol intake involves \"none.\"   Does does not use tobacco products.       Social:  Social History     Social History Narrative    Born in [facility]. Moved to [place] in 2014 at age 19 years. Then to [place] in 2018.     Now in school at [employer]. 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     Lives with mother, father and 2 younger siblings.          Partner: None now. Dates women  No concerns of STDs but willing to have tests for " "HIV and Hep C per protocols      ROS  PHQ-2 Score:         2/26/2024     9:00 AM   PHQ-2 ( 1999 Pfizer)   Q1: Little interest or pleasure in doing things 0   Q2: Feeling down, depressed or hopeless 0   PHQ-2 Score 0   Q1: Little interest or pleasure in doing things Not at all   Q2: Feeling down, depressed or hopeless Not at all   PHQ-2 Score 0         EXAM  /73 (BP Location: Left arm, Patient Position: Sitting, Cuff Size: Adult Regular)   Pulse 73   Temp 97.4  F (36.3  C) (Skin)   Resp 15   Ht 1.702 m (5' 7\")   Wt 64.9 kg (143 lb)   SpO2 98%   BMI 22.40 kg/m      GENERAL APPEARANCE:   Appears well  HEENT:  his eyes are normal.  Tympanic membranes are normal except for some cerumen in the left TM region. No adenopathy, thyroid normal to palpation  RESP: Lungs clear to auscultation bilaterally.  Axillae: no palpable axillary masses or adenopathy  CV: regular rate and rhythm, normal S1 S2,  no murmur, no carotid bruits  ABDOMEN: nontender, without HSM or masses. Bowel sounds normal  : Deferred. Reported no concerns.  Rectal exam: deferred  MS:  normal gait and full range of motion of all extremities..    SKIN: No suspicious lesions or rashes  NEURO: Normal strength and tone, sensory exam grossly normal  PSYCH: mentation and affect appear normal.   EXT: no peripheral edema        SEE TOP OF NOTE FOR ASSESSMENT AND PLAN      Dom Leos MD  Family Medicine and Sports Medicine  [facility] Department of Family Medicine and Community Health        "

## 2024-02-26 NOTE — PROGRESS NOTES
Preventive Care Visit  [facility]  Dom Leos MD, Family Medicine  Feb 26, 2024  {Provider  Link to [facility] :270142}  {PROVIDER CHARTING PREFERENCE:658205}    Emerson Baptiste is a 29 year old, presenting for the following:  Physical (Physical and establish care. )    {(!) Visit Details have not yet been documented.  Please enter Visit Details and then use this list to pull in documentation. (Optional):015416}     Health Care Directive  Patient does not have a Health Care Directive or Living Will: Discussed advance care planning with patient; information given to patient to review.    HPI  ***  {MA/LPN/RN Pre-Provider Visit Orders- hCG/UA/Strep (Optional):256127}  {SUPERLIST (Optional):341110}  {additonal problems for provider to add (Optional):680464}      2/26/2024   General Health   How would you rate your overall physical health? Good   Feel stress (tense, anxious, or unable to sleep) Very much   (!) STRESS CONCERN      2/26/2024   Nutrition   Three or more servings of calcium each day? (!) I DON'T KNOW   Diet: I don't know   How many servings of fruit and vegetables per day? (!) 0-1   How many sweetened beverages each day? 0-1         2/26/2024   Exercise   Days per week of moderate/strenous exercise 1 day   (!) EXERCISE CONCERN      2/26/2024   Social Factors   Frequency of gathering with friends or relatives Once a week   Worry food won't last until get money to buy more No    No   Food not last or not have enough money for food? No    No   Do you have housing?  Yes    Yes   Are you worried about losing your housing? No    No   Lack of transportation? No    No   Unable to get utilities (heat,electricity)? No    No         2/26/2024   Dental   Dentist two times every year? Yes         2/26/2024   TB Screening   Were you born outside of [place]?  (!) YES  ***         Today's PHQ-2 Score:       2/26/2024     9:00 AM   PHQ-2 ( 1999 Pfizer)   Q1: Little interest or pleasure in doing things 0   Q2:  "Feeling down, depressed or hopeless 0   PHQ-2 Score 0   Q1: Little interest or pleasure in doing things Not at all   Q2: Feeling down, depressed or hopeless Not at all   PHQ-2 Score 0           2/26/2024   Substance Use   Alcohol more than 3/day or more than 7/wk No   Do you use any other substances recreationally? No     Social History     Tobacco Use    Smoking status: Never    Smokeless tobacco: Never   Vaping Use    Vaping Use: Never used   Substance Use Topics    Alcohol use: Never    Drug use: Never     {Provider  If there are gaps in the social history shown above, please follow the link to update and then refresh the note Link to Social and Substance History :301017}        2/26/2024   One time HIV Screening   Previous HIV test? No         2/26/2024   STI Screening   New sexual partner(s) since last STI/HIV test? No         2/26/2024   Contraception/Family Planning   Questions about contraception or family planning No     {Provider  Use the storyboard to review patient history, after sections have been marked as reviewed, refresh note to capture documentation:033387}   Reviewed and updated as needed this visit by Provider                    {HISTORY OPTIONS (Optional):954606}    {ROS Picklists (Optional):649986}     Objective    Exam  /73 (BP Location: Left arm, Patient Position: Sitting, Cuff Size: Adult Regular)   Pulse 73   Temp 97.4  F (36.3  C) (Skin)   Resp 15   Ht 1.702 m (5' 7\")   Wt 64.9 kg (143 lb)   SpO2 98%   BMI 22.40 kg/m     Estimated body mass index is 22.4 kg/m  as calculated from the following:    Height as of this encounter: 1.702 m (5' 7\").    Weight as of this encounter: 64.9 kg (143 lb).    Physical Exam  {Exam Choices (Optional):793355}      Signed Electronically by: Dom Leos MD  {Email feedback regarding this note to [email or web address]   :791051}  "

## 2024-03-10 NOTE — PROGRESS NOTES
"ASSESSMENT AND PLAN:     (S69.91XA) Finger injury, right, initial encounter  (primary encounter diagnosis)  Comment: Acute, uncomplicated.    1 month of right 4th finger PIP pain and swelling after being hit with a soccer ball. ROM mostly intact, slight loss of a few degrees of flexion. Strength intact but has pain with resisted flexion and extension across PIP.    Put in finger brace that leaves MCP mobile for comfort.  Sending for x-ray to evaluate to fracture near/in PIP.     Plan: XR Finger Right G/E 2 Views          jR Wong MD   [facility]  03/11/2024, 3:51 PM      SUBJECTIVE:   Oliva is a 29 year old male who presents to clinic today for a return visit.      # Right ring finger Pain:  Location: right hand, ring finger  Duration:1 month   Trauma/ Fall? Hit with a soccer ball  - had pain initially, improved after 3-4 days  - still has pain in ring finger PIP  Swelling? Yes over PIP  Numbness/ Tingling? no  Treatment? no      There is no problem list on file for this patient.    No current outpatient medications on file.     No current facility-administered medications for this visit.       I have reviewed the patient's relevant past medical history.     OBJECTIVE:   /83   Pulse 68   Temp 98  F (36.7  C)   Ht 1.702 m (5' 7.01\")   Wt 64.9 kg (143 lb 1.3 oz)   SpO2 96%   BMI 22.40 kg/m      Constitutional: well-appearing, appears stated age  Eyes: conjunctivae without erythema, sclera anicteric.       Inspection: Swelling - present over right 4th finger PIP; Atrophy - no  Sensation: intact in affected finger    ROM:  Hand: Full flexion at PIP/DIP, finger abduction/ adduction.   - reduced flexion at DIP in ring finger    Bony tenderness: tender over 4th finger PIP, not MCP or DIP, not over any phalanges    Strength: Finger extension strength intact but has pain with resisted extension across MCP and to a lesser extent PIP, not over DIP  Finger flexion strength intact but " has pain with resisted flexion across PIP, not across MCP or DIP

## 2024-03-11 ENCOUNTER — OFFICE VISIT (OUTPATIENT)
Dept: FAMILY MEDICINE | Facility: CLINIC | Age: 29
End: 2024-03-11

## 2024-03-11 VITALS
OXYGEN SATURATION: 96 % | BODY MASS INDEX: 22.46 KG/M2 | WEIGHT: 143.08 LBS | DIASTOLIC BLOOD PRESSURE: 83 MMHG | SYSTOLIC BLOOD PRESSURE: 125 MMHG | TEMPERATURE: 98 F | HEART RATE: 68 BPM | HEIGHT: 67 IN

## 2024-03-11 DIAGNOSIS — S69.91XA FINGER INJURY, RIGHT, INITIAL ENCOUNTER: Primary | ICD-10-CM

## 2024-03-11 ASSESSMENT — PAIN SCALES - GENERAL: PAINLEVEL: SEVERE PAIN (6)

## 2024-03-11 NOTE — NURSING NOTE
"29 year old  Chief Complaint   Patient presents with    Finger     Right hand finger pain, started about 1 month ago. Pt reports he hit a soccer ball with his hand around that time.       Blood pressure 125/83, pulse 68, temperature 98  F (36.7  C), height 1.702 m (5' 7.01\"), weight 64.9 kg (143 lb 1.3 oz), SpO2 96%. Body mass index is 22.4 kg/m .  There is no problem list on file for this patient.      Wt Readings from Last 2 Encounters:   03/11/24 64.9 kg (143 lb 1.3 oz)   02/26/24 64.9 kg (143 lb)     BP Readings from Last 3 Encounters:   03/11/24 125/83   02/26/24 113/73   10/14/23 (!) 137/90         No current outpatient medications on file.     No current facility-administered medications for this visit.       Social History     Tobacco Use    Smoking status: Never    Smokeless tobacco: Never   Vaping Use    Vaping Use: Never used   Substance Use Topics    Alcohol use: Never    Drug use: Never       Health Maintenance Due   Topic Date Due    ADVANCE CARE PLANNING  Never done    DTAP/TDAP/TD IMMUNIZATION (1 - Tdap) Never done    INFLUENZA VACCINE (1) 09/01/2023    COVID-19 Vaccine (3 - 2023-24 season) 09/01/2023       No results found for: \"PAP\"      March 11, 2024 3:14 PM    "

## 2024-03-13 ENCOUNTER — ANCILLARY PROCEDURE (OUTPATIENT)
Dept: GENERAL RADIOLOGY | Facility: CLINIC | Age: 29
End: 2024-03-13
Attending: FAMILY MEDICINE

## 2024-03-13 DIAGNOSIS — S69.91XA FINGER INJURY, RIGHT, INITIAL ENCOUNTER: ICD-10-CM

## 2024-03-13 PROCEDURE — 73140 X-RAY EXAM OF FINGER(S): CPT | Mod: RT | Performed by: RADIOLOGY

## 2024-09-07 ENCOUNTER — HOSPITAL ENCOUNTER (EMERGENCY)
Facility: CLINIC | Age: 29
Discharge: HOME | End: 2024-09-07
Attending: EMERGENCY MEDICINE | Admitting: EMERGENCY MEDICINE

## 2024-09-07 VITALS
DIASTOLIC BLOOD PRESSURE: 92 MMHG | HEART RATE: 68 BPM | WEIGHT: 140 LBS | SYSTOLIC BLOOD PRESSURE: 126 MMHG | RESPIRATION RATE: 18 BRPM | OXYGEN SATURATION: 100 % | TEMPERATURE: 97.8 F | BODY MASS INDEX: 21.97 KG/M2 | HEIGHT: 67 IN

## 2024-09-07 DIAGNOSIS — K12.0 APHTHOUS ULCER OF TONGUE: ICD-10-CM

## 2024-09-07 LAB
HOLD SPECIMEN: NORMAL

## 2024-09-07 PROCEDURE — 99284 EMERGENCY DEPT VISIT MOD MDM: CPT | Mod: 25 | Performed by: EMERGENCY MEDICINE

## 2024-09-07 PROCEDURE — 250N000011 HC RX IP 250 OP 636: Performed by: EMERGENCY MEDICINE

## 2024-09-07 PROCEDURE — 64400 NJX AA&/STRD TRIGEMINAL NRV: CPT | Performed by: EMERGENCY MEDICINE

## 2024-09-07 RX ORDER — BUPIVACAINE HYDROCHLORIDE 5 MG/ML
30 INJECTION, SOLUTION PERINEURAL ONCE
Status: COMPLETED | OUTPATIENT
Start: 2024-09-07 | End: 2024-09-07

## 2024-09-07 RX ORDER — VALACYCLOVIR HYDROCHLORIDE 1 G/1
1000 TABLET, FILM COATED ORAL 3 TIMES DAILY
Qty: 21 TABLET | Refills: 0 | Status: SHIPPED | OUTPATIENT
Start: 2024-09-07

## 2024-09-07 RX ORDER — LIDOCAINE HYDROCHLORIDE 20 MG/ML
5 SOLUTION OROPHARYNGEAL
Qty: 50 ML | Refills: 0 | Status: SHIPPED | OUTPATIENT
Start: 2024-09-07

## 2024-09-07 RX ORDER — CHLORHEXIDINE GLUCONATE ORAL RINSE 1.2 MG/ML
15 SOLUTION DENTAL 2 TIMES DAILY
Qty: 210 ML | Refills: 0 | Status: SHIPPED | OUTPATIENT
Start: 2024-09-07 | End: 2024-09-14

## 2024-09-07 RX ORDER — BUPIVACAINE HYDROCHLORIDE 5 MG/ML
INJECTION, SOLUTION EPIDURAL; INTRACAUDAL
Status: COMPLETED
Start: 2024-09-07 | End: 2024-09-07

## 2024-09-07 RX ADMIN — BUPIVACAINE HYDROCHLORIDE 25 MG: 5 INJECTION, SOLUTION EPIDURAL; INTRACAUDAL; PERINEURAL at 05:50

## 2024-09-07 RX ADMIN — BUPIVACAINE HYDROCHLORIDE 25 MG: 5 INJECTION, SOLUTION PERINEURAL at 05:50

## 2024-09-07 ASSESSMENT — ACTIVITIES OF DAILY LIVING (ADL)
ADLS_ACUITY_SCORE: 35
ADLS_ACUITY_SCORE: 35

## 2024-09-07 ASSESSMENT — COLUMBIA-SUICIDE SEVERITY RATING SCALE - C-SSRS
2. HAVE YOU ACTUALLY HAD ANY THOUGHTS OF KILLING YOURSELF IN THE PAST MONTH?: NO
6. HAVE YOU EVER DONE ANYTHING, STARTED TO DO ANYTHING, OR PREPARED TO DO ANYTHING TO END YOUR LIFE?: NO
1. IN THE PAST MONTH, HAVE YOU WISHED YOU WERE DEAD OR WISHED YOU COULD GO TO SLEEP AND NOT WAKE UP?: NO

## 2024-09-07 NOTE — ED TRIAGE NOTES
Sore on tongue. Pt has hx of similar concern with most recent being oct. 2023. Patient having trouble speaking in triage related to pain and tongue swelling     Triage Assessment (Adult)       Row Name 09/07/24 0502          Triage Assessment    Airway WDL --  tongue swelling        Respiratory WDL    Respiratory WDL WDL        Skin Circulation/Temperature WDL    Skin Circulation/Temperature WDL WDL        Cardiac WDL    Cardiac WDL WDL        Peripheral/Neurovascular WDL    Peripheral Neurovascular WDL WDL        Cognitive/Neuro/Behavioral WDL    Cognitive/Neuro/Behavioral WDL WDL                      Universal Safety Interventions

## 2024-09-07 NOTE — ED PROVIDER NOTES
"      ED Provider Note  September 7, 2024  [facility]      History     Chief Complaint: Oral Swelling      HPI  Oliva Martinez is a 29 year old male presenting to the ED returning to the emergency department for the third time for ulceration and severe pain along his left lower tongue.  He was seen for similar events twice before as well as by ENT in the emergency department diagnosed with a aphthous ulcer.    Pain radiates from the tongue down to his neck.  Feels like it is difficult to speak and swallow because of the severity of the pain.    No shortness of breath.  No other rashes.  No fever.        Past Medical History  No past medical history on file.  No past surgical history on file.  chlorhexidine (PERIDEX) 0.12 % solution  lidocaine, viscous, (XYLOCAINE) 2 % solution  valACYclovir (VALTREX) 1000 mg tablet      No Known Allergies  Family History  No family history on file.  Social History   Social History     Tobacco Use    Smoking status: Never    Smokeless tobacco: Never   Vaping Use    Vaping status: Never Used   Substance Use Topics    Alcohol use: Never    Drug use: Never        Past medical history, past surgical history, medications, allergies, family history, and social history were reviewed with the patient. No additional pertinent items.      A medically appropriate review of systems was performed with pertinent positives and negatives noted in the HPI, and all other systems negative.    Physical Exam   BP (!) 133/92   Pulse 59   Temp 97.8  F (36.6  C) (Axillary)   Resp 18   Ht 1.702 m (5' 7\")   Wt 63.5 kg (140 lb)   SpO2 100%   BMI 21.93 kg/m      Oropharyngeal exam notable for oral pharynx clear.  Uvula midline.  Tongue not edematous.  Along the lower mucosal aspect of the tongue left side there is a aphthous ulcer approximately 1.5 x 1.5 cm.  Appears superficial, no deep ulceration or mass.  No bleeding.    Neck is supple.  No LAD.  Lungs clear to " auscultation bilaterally.  No stridor.    ED Course, Procedures, & Data      Procedures                    Results for orders placed or performed during the hospital encounter of 09/07/24   [facility] Draw     Status: None    Narrative    The following orders were created for panel order [place] Draw.  Procedure                               Abnormality         Status                     ---------                               -----------         ------                     Extra Blue Top Tube[[id number]]                              Final result               Extra Red Top Tube[[id number]]                               Final result               Extra Green Top (Lithium...[[id number]]                      Final result               Extra Purple Top Tube[[id number]]                            Final result                 Please view results for these tests on the individual orders.   Extra Blue Top Tube     Status: None   Result Value Ref Range    Hold Specimen JIC    Extra Red Top Tube     Status: None   Result Value Ref Range    Hold Specimen JIC    Extra Green Top (Lithium Heparin) Tube     Status: None   Result Value Ref Range    Hold Specimen JIC    Extra Purple Top Tube     Status: None   Result Value Ref Range    Hold Specimen JIC      Medications   BUPivacaine (MARCAINE) 0.5% injection MDV (25 mg Dental $Given 9/7/24 0510)     Labs Ordered and Resulted from Time of ED Arrival to Time of ED Departure - No data to display  No orders to display            Medical Decision Making Matrix    Problems Addressed        Data   Considered  Data Moderate: Order of (or considering) each unique test (Cat 1), Review of the results of each unique test (Cat 1), and Assessment requiring independent historian(s) (Cat 2) part of history provided by brother as the patient is having great difficulty speaking due to pain.  And language barrier.      Risk of Patient Management      Moderate Risk: Prescription drug management             Assessment & Plan    29-year-old male presenting with tongue lesion as described.  Suspicious for aphthous ulcer  No evidence of angioedema or allergic reaction.  Tongue not significant swollen.  No masses visualized.  Neck is supple doubt deep space infection    Patient offered excepted and provided a lingual block which resulted in significant improvement and near resolution of his pain.  Warned that this would only last approximately 6-8 hours     Due to suspicion for aphthous ulcer possibly herpes will initiate Valtrex to potentially accelerate resolution  Peridex washes for hygiene, lidocaine viscus to help with topical pain  -ENT follow-up referral given and again encouraged    - Patient agrees to our plan and is ready and eager for discharge. Care plan, follow up plan, and reasons to return immediately to the ED were dicussed in detail and summarized as noted in the discharge instructions.        I have reviewed the nursing notes. I have reviewed the findings, diagnosis, plan and need for follow up with the patient.    New Prescriptions    CHLORHEXIDINE (PERIDEX) 0.12 % SOLUTION    Swish and spit 15 mLs in mouth 2 times daily for 7 days.    LIDOCAINE, VISCOUS, (XYLOCAINE) 2 % SOLUTION    Swish and spit 5 mLs in mouth every 3 hours as needed for pain. ; Max 8 doses/24 hour period.    VALACYCLOVIR (VALTREX) 1000 MG TABLET    Take 1 tablet (1,000 mg) by mouth 3 times daily.       Final diagnoses:   Aphthous ulcer of tongue       Gigi Baptiste MD  [facility] EMERGENCY DEPARTMENT  September 7, 2024       Gigi Baptiste MD  09/07/24 0768

## 2024-09-07 NOTE — DISCHARGE INSTRUCTIONS
Take medications as prescribed    Return to the emergency department for any worsening swelling, difficulty swallowing, increased pain, redness drainage, fever, neck pain or stiffness or any other concerns for worsening.  In addition to your medications please also take Tylenol and ibuprofen as needed for pain    You can take tylenol as needed for pain. The maximum daily dose is 4000 mg and the maximum single dose at a time is 1000mg. For your condition, your should take 1000mg every 6 hours as needed for pain.    You can take ibuprofen for pain. The maximum daily dose is 2400 mg and the maximum single dose as a time is 800mg. For your condition, your should take 600mg every 4 hours as needed for pain.

## 2025-04-06 ENCOUNTER — HEALTH MAINTENANCE LETTER (OUTPATIENT)
Age: 30
End: 2025-04-06